# Patient Record
Sex: FEMALE | Race: WHITE | Employment: OTHER | URBAN - NONMETROPOLITAN AREA
[De-identification: names, ages, dates, MRNs, and addresses within clinical notes are randomized per-mention and may not be internally consistent; named-entity substitution may affect disease eponyms.]

---

## 2017-01-13 ENCOUNTER — HOSPITAL ENCOUNTER (OUTPATIENT)
Dept: PAIN MANAGEMENT | Age: 32
Discharge: HOME | End: 2017-01-13
Payer: MEDICAID

## 2017-01-13 VITALS
WEIGHT: 161 LBS | SYSTOLIC BLOOD PRESSURE: 123 MMHG | TEMPERATURE: 97.4 F | DIASTOLIC BLOOD PRESSURE: 85 MMHG | BODY MASS INDEX: 25.88 KG/M2 | HEIGHT: 66 IN | RESPIRATION RATE: 16 BRPM | HEART RATE: 73 BPM | OXYGEN SATURATION: 100 %

## 2017-01-13 DIAGNOSIS — G89.29 CHRONIC BILATERAL LOW BACK PAIN WITHOUT SCIATICA: ICD-10-CM

## 2017-01-13 DIAGNOSIS — M70.61 TROCHANTERIC BURSITIS OF BOTH HIPS: ICD-10-CM

## 2017-01-13 DIAGNOSIS — M70.62 TROCHANTERIC BURSITIS OF BOTH HIPS: ICD-10-CM

## 2017-01-13 DIAGNOSIS — M54.50 CHRONIC BILATERAL LOW BACK PAIN WITHOUT SCIATICA: ICD-10-CM

## 2017-01-13 DIAGNOSIS — G89.29 CHRONIC PAIN OF BOTH SHOULDERS: ICD-10-CM

## 2017-01-13 DIAGNOSIS — M25.552 PAIN OF BOTH HIP JOINTS: ICD-10-CM

## 2017-01-13 DIAGNOSIS — M25.551 PAIN OF BOTH HIP JOINTS: ICD-10-CM

## 2017-01-13 DIAGNOSIS — M25.511 CHRONIC PAIN OF BOTH SHOULDERS: ICD-10-CM

## 2017-01-13 DIAGNOSIS — M25.512 CHRONIC PAIN OF BOTH SHOULDERS: ICD-10-CM

## 2017-01-13 PROCEDURE — 6360000002 HC RX W HCPCS

## 2017-01-13 PROCEDURE — 2500000003 HC RX 250 WO HCPCS

## 2017-01-13 PROCEDURE — 20611 DRAIN/INJ JOINT/BURSA W/US: CPT

## 2017-01-13 RX ORDER — OXYCODONE HYDROCHLORIDE 15 MG/1
15 TABLET ORAL EVERY 6 HOURS PRN
Qty: 120 TABLET | Refills: 0 | Status: SHIPPED | OUTPATIENT
Start: 2017-02-25 | End: 2017-03-21 | Stop reason: SDUPTHER

## 2017-01-13 RX ORDER — OXYCODONE HYDROCHLORIDE 15 MG/1
15 TABLET ORAL EVERY 6 HOURS PRN
Qty: 120 TABLET | Refills: 0 | Status: SHIPPED | OUTPATIENT
Start: 2017-01-26 | End: 2017-01-13 | Stop reason: SDUPTHER

## 2017-01-13 RX ORDER — TRIAMCINOLONE ACETONIDE 40 MG/ML
INJECTION, SUSPENSION INTRA-ARTICULAR; INTRAMUSCULAR
Status: COMPLETED | OUTPATIENT
Start: 2017-01-13 | End: 2017-01-13

## 2017-01-13 RX ORDER — BUPIVACAINE HYDROCHLORIDE 5 MG/ML
INJECTION, SOLUTION EPIDURAL; INTRACAUDAL
Status: COMPLETED | OUTPATIENT
Start: 2017-01-13 | End: 2017-01-13

## 2017-01-13 RX ADMIN — BUPIVACAINE HYDROCHLORIDE 9 ML: 5 INJECTION, SOLUTION EPIDURAL; INTRACAUDAL at 12:37

## 2017-01-13 RX ADMIN — TRIAMCINOLONE ACETONIDE 40 MG: 40 INJECTION, SUSPENSION INTRA-ARTICULAR; INTRAMUSCULAR at 12:37

## 2017-01-13 ASSESSMENT — PAIN - FUNCTIONAL ASSESSMENT: PAIN_FUNCTIONAL_ASSESSMENT: 0-10

## 2017-01-13 ASSESSMENT — ACTIVITIES OF DAILY LIVING (ADL): EFFECT OF PAIN ON DAILY ACTIVITIES: DAILY CHORES AND ACTIVITIES

## 2017-01-13 ASSESSMENT — PAIN DESCRIPTION - DESCRIPTORS: DESCRIPTORS: ACHING;THROBBING

## 2017-03-22 RX ORDER — OXYCODONE HYDROCHLORIDE 15 MG/1
15 TABLET ORAL EVERY 6 HOURS PRN
Qty: 120 TABLET | Refills: 0 | Status: SHIPPED | OUTPATIENT
Start: 2017-03-27 | End: 2017-04-17 | Stop reason: SDUPTHER

## 2017-04-17 ENCOUNTER — HOSPITAL ENCOUNTER (OUTPATIENT)
Dept: PAIN MANAGEMENT | Age: 32
Discharge: HOME | End: 2017-04-17
Payer: MEDICARE

## 2017-04-17 VITALS
HEIGHT: 66 IN | DIASTOLIC BLOOD PRESSURE: 72 MMHG | RESPIRATION RATE: 18 BRPM | TEMPERATURE: 98.1 F | OXYGEN SATURATION: 100 % | SYSTOLIC BLOOD PRESSURE: 128 MMHG | BODY MASS INDEX: 21.69 KG/M2 | HEART RATE: 60 BPM | WEIGHT: 135 LBS

## 2017-04-17 VITALS
RESPIRATION RATE: 18 BRPM | SYSTOLIC BLOOD PRESSURE: 114 MMHG | HEART RATE: 60 BPM | DIASTOLIC BLOOD PRESSURE: 75 MMHG | OXYGEN SATURATION: 97 %

## 2017-04-17 DIAGNOSIS — M25.552 BILATERAL HIP PAIN: ICD-10-CM

## 2017-04-17 DIAGNOSIS — M70.61 TROCHANTERIC BURSITIS OF BOTH HIPS: ICD-10-CM

## 2017-04-17 DIAGNOSIS — M25.551 BILATERAL HIP PAIN: ICD-10-CM

## 2017-04-17 DIAGNOSIS — M70.62 TROCHANTERIC BURSITIS OF BOTH HIPS: ICD-10-CM

## 2017-04-17 PROCEDURE — 2500000003 HC RX 250 WO HCPCS

## 2017-04-17 PROCEDURE — 20553 NJX 1/MLT TRIGGER POINTS 3/>: CPT

## 2017-04-17 PROCEDURE — 3209999900 FLUORO FOR SURGICAL PROCEDURES

## 2017-04-17 PROCEDURE — 20610 DRAIN/INJ JOINT/BURSA W/O US: CPT

## 2017-04-17 PROCEDURE — 6360000002 HC RX W HCPCS

## 2017-04-17 RX ORDER — BUPIVACAINE HYDROCHLORIDE 5 MG/ML
INJECTION, SOLUTION EPIDURAL; INTRACAUDAL
Status: COMPLETED | OUTPATIENT
Start: 2017-04-17 | End: 2017-04-17

## 2017-04-17 RX ORDER — TRIAMCINOLONE ACETONIDE 40 MG/ML
INJECTION, SUSPENSION INTRA-ARTICULAR; INTRAMUSCULAR
Status: COMPLETED | OUTPATIENT
Start: 2017-04-17 | End: 2017-04-17

## 2017-04-17 RX ADMIN — TRIAMCINOLONE ACETONIDE 80 MG: 40 INJECTION, SUSPENSION INTRA-ARTICULAR; INTRAMUSCULAR at 11:17

## 2017-04-17 RX ADMIN — BUPIVACAINE HYDROCHLORIDE 9.5 ML: 5 INJECTION, SOLUTION EPIDURAL; INTRACAUDAL at 11:17

## 2017-04-17 RX ADMIN — BUPIVACAINE HYDROCHLORIDE 18 ML: 5 INJECTION, SOLUTION EPIDURAL; INTRACAUDAL at 11:16

## 2017-04-17 RX ADMIN — TRIAMCINOLONE ACETONIDE 20 MG: 40 INJECTION, SUSPENSION INTRA-ARTICULAR; INTRAMUSCULAR at 11:18

## 2017-04-17 ASSESSMENT — PAIN - FUNCTIONAL ASSESSMENT: PAIN_FUNCTIONAL_ASSESSMENT: 0-10

## 2017-04-17 ASSESSMENT — PAIN DESCRIPTION - DESCRIPTORS: DESCRIPTORS: ACHING;THROBBING

## 2017-04-17 ASSESSMENT — ACTIVITIES OF DAILY LIVING (ADL): EFFECT OF PAIN ON DAILY ACTIVITIES: DAILY CHORES AND ACTIVITIES

## 2017-05-04 ENCOUNTER — HOSPITAL ENCOUNTER (EMERGENCY)
Facility: HOSPITAL | Age: 32
Discharge: HOME | End: 2017-05-04
Attending: EMERGENCY MEDICINE | Admitting: EMERGENCY MEDICINE

## 2017-05-04 ENCOUNTER — APPOINTMENT (OUTPATIENT)
Dept: CT IMAGING | Facility: HOSPITAL | Age: 32
End: 2017-05-04

## 2017-05-04 ENCOUNTER — HOSPITAL ENCOUNTER (OUTPATIENT)
Dept: CT IMAGING | Facility: HOSPITAL | Age: 32
End: 2017-05-04

## 2017-05-04 VITALS
HEART RATE: 76 BPM | DIASTOLIC BLOOD PRESSURE: 76 MMHG | SYSTOLIC BLOOD PRESSURE: 148 MMHG | OXYGEN SATURATION: 100 % | TEMPERATURE: 97.5 F | RESPIRATION RATE: 16 BRPM
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
## 2017-05-04 DIAGNOSIS — S20.212A CONTUSION OF LEFT FRONT WALL OF THORAX, INITIAL ENCOUNTER: ICD-10-CM

## 2017-05-04 LAB
HOLD SPECIMEN: NORMAL
HOLD SPECIMEN: NORMAL
WHOLE BLOOD HOLD SPECIMEN: NORMAL
WHOLE BLOOD HOLD SPECIMEN: NORMAL

## 2017-05-04 PROCEDURE — 99284 EMERGENCY DEPT VISIT MOD MDM: CPT

## 2017-05-04 PROCEDURE — 72125 CT NECK SPINE W/O DYE: CPT

## 2017-05-04 PROCEDURE — 70450 CT HEAD/BRAIN W/O DYE: CPT

## 2017-05-04 RX ORDER — ALPRAZOLAM 1 MG/1
1 TABLET ORAL 2 TIMES DAILY PRN
COMMUNITY
End: 2020-03-25

## 2017-05-04 RX ORDER — ISOSORBIDE MONONITRATE 60 MG/1
80 TABLET, EXTENDED RELEASE ORAL 2 TIMES DAILY
COMMUNITY
End: 2020-03-25

## 2017-05-04 RX ORDER — ESTRADIOL 1 MG/1
1 TABLET ORAL DAILY
COMMUNITY

## 2017-05-04 RX ORDER — METHOCARBAMOL 750 MG/1
750 TABLET, FILM COATED ORAL 3 TIMES DAILY
COMMUNITY

## 2017-05-04 RX ORDER — AZATHIOPRINE 100 MG/1
150 TABLET ORAL 2 TIMES DAILY
COMMUNITY
End: 2021-02-20

## 2017-05-04 RX ORDER — OXYCODONE HYDROCHLORIDE 15 MG/1
15 TABLET ORAL EVERY 4 HOURS PRN
COMMUNITY

## 2017-05-04 RX ORDER — GABAPENTIN 100 MG/1
900 CAPSULE ORAL 3 TIMES DAILY
COMMUNITY

## 2017-05-19 ENCOUNTER — HOSPITAL ENCOUNTER (OUTPATIENT)
Dept: PAIN MANAGEMENT | Age: 32
Discharge: HOME | End: 2017-05-19
Payer: MEDICARE

## 2017-05-19 VITALS
SYSTOLIC BLOOD PRESSURE: 126 MMHG | TEMPERATURE: 98.6 F | RESPIRATION RATE: 16 BRPM | HEART RATE: 66 BPM | OXYGEN SATURATION: 99 % | DIASTOLIC BLOOD PRESSURE: 89 MMHG

## 2017-05-19 PROCEDURE — 80307 DRUG TEST PRSMV CHEM ANLYZR: CPT

## 2017-05-19 PROCEDURE — 99214 OFFICE O/P EST MOD 30 MIN: CPT

## 2017-05-19 ASSESSMENT — PAIN DESCRIPTION - PROGRESSION: CLINICAL_PROGRESSION: NOT CHANGED

## 2017-05-19 ASSESSMENT — PAIN DESCRIPTION - FREQUENCY: FREQUENCY: CONTINUOUS

## 2017-05-19 ASSESSMENT — PAIN DESCRIPTION - ONSET: ONSET: AWAKENED FROM SLEEP

## 2017-05-19 ASSESSMENT — PAIN DESCRIPTION - DESCRIPTORS: DESCRIPTORS: THROBBING

## 2017-05-19 ASSESSMENT — PAIN SCALES - GENERAL: PAINLEVEL_OUTOF10: 4

## 2017-05-19 ASSESSMENT — PAIN DESCRIPTION - LOCATION: LOCATION: HIP

## 2017-05-19 ASSESSMENT — PAIN DESCRIPTION - PAIN TYPE: TYPE: CHRONIC PAIN

## 2017-05-19 ASSESSMENT — ACTIVITIES OF DAILY LIVING (ADL): EFFECT OF PAIN ON DAILY ACTIVITIES: DAILY CHORES AND ACTIVITIES

## 2017-05-30 LAB
ALPRAZOLAM, URINE CONFIRM: 2310 NG/ML
ALPRAZOLAM, URINE: POSITIVE
AMOBARBITAL, URINE: NEGATIVE
AMPHETAMINES, URINE: NEGATIVE NG/ML
BARBITURATES, URINE: ABNORMAL NG/ML
BARBITURATES, URINE: POSITIVE
BENZODIAZEPINES, URINE: ABNORMAL NG/ML
BENZODIAZEPINES, URINE: POSITIVE NG/ML
BUTALBITAL, GC/MS URINE: 1570 NG/ML
BUTALBITAL, URINE: POSITIVE
CANNABINOIDS, URINE: NEGATIVE NG/ML
CLONAZEPAM, URINE: NEGATIVE
COCAINE METABOLITE, URINE: NEGATIVE NG/ML
CREATININE, URINE: 106.6 MG/DL (ref 20–300)
ETHANOL U, QUAN: NEGATIVE %
FENTANYL URINE: NEGATIVE PG/ML
FLURAZEPAM, UR: NEGATIVE
LORAZEPAM, URINE: NEGATIVE
MEPERIDINE, UR: NEGATIVE NG/ML
METHADONE SCREEN, URINE: NEGATIVE NG/ML
MIDAZOLAM, URINE: NEGATIVE
NORDIAZEPAM, URINE: NEGATIVE
OPIATES, URINE: ABNORMAL NG/ML
OPIATES, URINE: NEGATIVE NG/ML
OXAZEPAM, UR CONF: 5410 NG/ML
OXAZEPAM, URINE: POSITIVE
OXYCODONE URINE: POSITIVE
OXYCODONE, UR GC/MS: >3000 NG/ML
OXYCODONE/OXYMORPH, UR: POSITIVE
OXYCODONE/OXYMORPHONE, UR: ABNORMAL NG/ML
OXYMORPHONE, UR GC/MS: >3000 NG/ML
OXYMORPHONE, URINE: POSITIVE
PENTOBARBITAL, UR: NEGATIVE
PH, URINE: 5.5 (ref 4.5–8.9)
PHENCYCLIDINE, URINE: NEGATIVE NG/ML
PHENOBARBITAL, UR: NEGATIVE
PROPOXYPHENE, URINE: NEGATIVE NG/ML
SECOBARBITAL, UR: NEGATIVE
TEMAZEPAM, UR CONF: ABNORMAL NG/ML
TEMAZEPAM, URINE: POSITIVE
TRIAZOLAM, URINE: NEGATIVE

## 2017-06-21 RX ORDER — OXYCODONE HYDROCHLORIDE 15 MG/1
15 TABLET ORAL EVERY 6 HOURS PRN
Qty: 120 TABLET | Refills: 0 | Status: SHIPPED | OUTPATIENT
Start: 2017-06-25 | End: 2017-07-20 | Stop reason: SDUPTHER

## 2017-07-10 ENCOUNTER — HOSPITAL ENCOUNTER (OUTPATIENT)
Dept: MRI IMAGING | Facility: HOSPITAL | Age: 32
Discharge: HOME | End: 2017-07-10

## 2017-07-10 ENCOUNTER — HOSPITAL ENCOUNTER (OUTPATIENT)
Dept: MRI IMAGING | Facility: HOSPITAL | Age: 32
Discharge: HOME | End: 2017-07-10
Admitting: PSYCHIATRY & NEUROLOGY

## 2017-07-10 DIAGNOSIS — G36.0 NEUROMYELITIS OPTICA (DEVIC) (HCC): ICD-10-CM

## 2017-07-10 PROCEDURE — 72157 MRI CHEST SPINE W/O & W/DYE: CPT

## 2017-07-10 PROCEDURE — 72156 MRI NECK SPINE W/O & W/DYE: CPT

## 2017-07-10 PROCEDURE — 25010000002 GADOTERIDOL PER 1 ML: Performed by: PSYCHIATRY & NEUROLOGY

## 2017-07-10 PROCEDURE — A9576 INJ PROHANCE MULTIPACK: HCPCS | Performed by: PSYCHIATRY & NEUROLOGY

## 2017-07-10 PROCEDURE — 70553 MRI BRAIN STEM W/O & W/DYE: CPT

## 2017-07-10 RX ADMIN — GADOTERIDOL 12 ML: 279.3 INJECTION, SOLUTION INTRAVENOUS at 15:00

## 2017-07-20 ENCOUNTER — HOSPITAL ENCOUNTER (OUTPATIENT)
Dept: PAIN MANAGEMENT | Age: 32
Discharge: HOME | End: 2017-07-20
Payer: MEDICARE

## 2017-07-20 VITALS
RESPIRATION RATE: 18 BRPM | WEIGHT: 131 LBS | BODY MASS INDEX: 21.05 KG/M2 | DIASTOLIC BLOOD PRESSURE: 62 MMHG | TEMPERATURE: 98.1 F | HEIGHT: 66 IN | HEART RATE: 70 BPM | OXYGEN SATURATION: 97 % | SYSTOLIC BLOOD PRESSURE: 113 MMHG

## 2017-07-20 DIAGNOSIS — M25.552 BILATERAL HIP PAIN: ICD-10-CM

## 2017-07-20 DIAGNOSIS — M54.50 CHRONIC BILATERAL LOW BACK PAIN WITHOUT SCIATICA: ICD-10-CM

## 2017-07-20 DIAGNOSIS — M25.551 PAIN OF BOTH HIP JOINTS: ICD-10-CM

## 2017-07-20 DIAGNOSIS — M70.61 TROCHANTERIC BURSITIS OF BOTH HIPS: ICD-10-CM

## 2017-07-20 DIAGNOSIS — M25.551 BILATERAL HIP PAIN: ICD-10-CM

## 2017-07-20 DIAGNOSIS — M25.512 CHRONIC PAIN OF BOTH SHOULDERS: ICD-10-CM

## 2017-07-20 DIAGNOSIS — M70.62 TROCHANTERIC BURSITIS OF BOTH HIPS: ICD-10-CM

## 2017-07-20 DIAGNOSIS — G89.29 CHRONIC BILATERAL LOW BACK PAIN WITHOUT SCIATICA: ICD-10-CM

## 2017-07-20 DIAGNOSIS — G89.29 CHRONIC PAIN OF BOTH SHOULDERS: ICD-10-CM

## 2017-07-20 DIAGNOSIS — M25.552 PAIN OF BOTH HIP JOINTS: ICD-10-CM

## 2017-07-20 DIAGNOSIS — M25.511 CHRONIC PAIN OF BOTH SHOULDERS: ICD-10-CM

## 2017-07-20 PROCEDURE — 20553 NJX 1/MLT TRIGGER POINTS 3/>: CPT

## 2017-07-20 PROCEDURE — 2500000003 HC RX 250 WO HCPCS

## 2017-07-20 PROCEDURE — 20610 DRAIN/INJ JOINT/BURSA W/O US: CPT

## 2017-07-20 PROCEDURE — 6360000002 HC RX W HCPCS

## 2017-07-20 PROCEDURE — 3209999900 FLUORO FOR SURGICAL PROCEDURES

## 2017-07-20 RX ORDER — BUPIVACAINE HYDROCHLORIDE 5 MG/ML
INJECTION, SOLUTION EPIDURAL; INTRACAUDAL
Status: COMPLETED | OUTPATIENT
Start: 2017-07-20 | End: 2017-07-20

## 2017-07-20 RX ORDER — OXYCODONE HYDROCHLORIDE 15 MG/1
15 TABLET ORAL EVERY 6 HOURS PRN
Qty: 120 TABLET | Refills: 0 | Status: SHIPPED | OUTPATIENT
Start: 2017-07-25 | End: 2017-08-21 | Stop reason: SDUPTHER

## 2017-07-20 RX ORDER — TRIAMCINOLONE ACETONIDE 40 MG/ML
INJECTION, SUSPENSION INTRA-ARTICULAR; INTRAMUSCULAR
Status: COMPLETED | OUTPATIENT
Start: 2017-07-20 | End: 2017-07-20

## 2017-07-20 RX ADMIN — BUPIVACAINE HYDROCHLORIDE 9.5 ML: 5 INJECTION, SOLUTION EPIDURAL; INTRACAUDAL at 14:52

## 2017-07-20 RX ADMIN — TRIAMCINOLONE ACETONIDE 40 MG: 40 INJECTION, SUSPENSION INTRA-ARTICULAR; INTRAMUSCULAR at 14:52

## 2017-07-20 RX ADMIN — TRIAMCINOLONE ACETONIDE 20 MG: 40 INJECTION, SUSPENSION INTRA-ARTICULAR; INTRAMUSCULAR at 14:54

## 2017-07-20 RX ADMIN — BUPIVACAINE HYDROCHLORIDE 9 ML: 5 INJECTION, SOLUTION EPIDURAL; INTRACAUDAL at 14:51

## 2017-07-20 ASSESSMENT — PAIN DESCRIPTION - DESCRIPTORS: DESCRIPTORS: ACHING;CONSTANT;THROBBING;RADIATING

## 2017-07-20 ASSESSMENT — PAIN - FUNCTIONAL ASSESSMENT: PAIN_FUNCTIONAL_ASSESSMENT: 0-10

## 2017-07-20 ASSESSMENT — ACTIVITIES OF DAILY LIVING (ADL): EFFECT OF PAIN ON DAILY ACTIVITIES: DAILY CHORES AND ACTIVITIES

## 2017-08-21 RX ORDER — OXYCODONE HYDROCHLORIDE 15 MG/1
15 TABLET ORAL EVERY 6 HOURS PRN
Qty: 120 TABLET | Refills: 0 | Status: SHIPPED | OUTPATIENT
Start: 2017-08-24 | End: 2017-09-18 | Stop reason: SDUPTHER

## 2017-09-18 ENCOUNTER — HOSPITAL ENCOUNTER (OUTPATIENT)
Dept: PAIN MANAGEMENT | Age: 32
Discharge: HOME | End: 2017-09-18
Payer: MEDICARE

## 2017-09-18 VITALS
TEMPERATURE: 97.1 F | HEART RATE: 65 BPM | SYSTOLIC BLOOD PRESSURE: 120 MMHG | DIASTOLIC BLOOD PRESSURE: 87 MMHG | BODY MASS INDEX: 19.13 KG/M2 | RESPIRATION RATE: 18 BRPM | WEIGHT: 119 LBS | HEIGHT: 66 IN | OXYGEN SATURATION: 98 %

## 2017-09-18 DIAGNOSIS — M25.511 CHRONIC PAIN OF BOTH SHOULDERS: ICD-10-CM

## 2017-09-18 DIAGNOSIS — G89.29 CHRONIC PAIN OF BOTH SHOULDERS: ICD-10-CM

## 2017-09-18 DIAGNOSIS — M25.551 PAIN OF BOTH HIP JOINTS: ICD-10-CM

## 2017-09-18 DIAGNOSIS — M70.62 TROCHANTERIC BURSITIS OF BOTH HIPS: ICD-10-CM

## 2017-09-18 DIAGNOSIS — M25.512 CHRONIC PAIN OF BOTH SHOULDERS: ICD-10-CM

## 2017-09-18 DIAGNOSIS — G89.29 CHRONIC BILATERAL LOW BACK PAIN WITHOUT SCIATICA: ICD-10-CM

## 2017-09-18 DIAGNOSIS — M25.552 PAIN OF BOTH HIP JOINTS: ICD-10-CM

## 2017-09-18 DIAGNOSIS — M54.50 CHRONIC BILATERAL LOW BACK PAIN WITHOUT SCIATICA: ICD-10-CM

## 2017-09-18 DIAGNOSIS — M70.61 TROCHANTERIC BURSITIS OF BOTH HIPS: ICD-10-CM

## 2017-09-18 PROCEDURE — 99213 OFFICE O/P EST LOW 20 MIN: CPT

## 2017-09-18 RX ORDER — OXYCODONE HYDROCHLORIDE 15 MG/1
15 TABLET ORAL EVERY 6 HOURS PRN
Qty: 120 TABLET | Refills: 0 | Status: SHIPPED | OUTPATIENT
Start: 2017-09-23 | End: 2017-10-23 | Stop reason: SDUPTHER

## 2017-09-18 ASSESSMENT — ACTIVITIES OF DAILY LIVING (ADL): EFFECT OF PAIN ON DAILY ACTIVITIES: LIMITS ACTIVITY

## 2017-09-18 ASSESSMENT — PAIN DESCRIPTION - LOCATION: LOCATION: HIP;NECK

## 2017-09-18 ASSESSMENT — PAIN DESCRIPTION - PROGRESSION: CLINICAL_PROGRESSION: NOT CHANGED

## 2017-09-18 ASSESSMENT — PAIN DESCRIPTION - DESCRIPTORS: DESCRIPTORS: ACHING;CONSTANT;THROBBING;RADIATING

## 2017-09-18 ASSESSMENT — PAIN DESCRIPTION - FREQUENCY: FREQUENCY: CONTINUOUS

## 2017-09-18 ASSESSMENT — PAIN DESCRIPTION - DIRECTION: RADIATING_TOWARDS: INTO BILATERAL LEGS

## 2017-09-18 ASSESSMENT — PAIN DESCRIPTION - ONSET: ONSET: ON-GOING

## 2017-09-18 ASSESSMENT — PAIN SCALES - GENERAL: PAINLEVEL_OUTOF10: 6

## 2017-09-18 ASSESSMENT — PAIN DESCRIPTION - PAIN TYPE: TYPE: CHRONIC PAIN

## 2017-09-18 ASSESSMENT — PAIN DESCRIPTION - ORIENTATION: ORIENTATION: RIGHT;LEFT;MID

## 2017-10-23 ENCOUNTER — HOSPITAL ENCOUNTER (OUTPATIENT)
Dept: PAIN MANAGEMENT | Age: 32
Discharge: HOME | End: 2017-10-23
Payer: OTHER MISCELLANEOUS

## 2017-10-23 VITALS
RESPIRATION RATE: 16 BRPM | TEMPERATURE: 98.4 F | BODY MASS INDEX: 18.96 KG/M2 | WEIGHT: 118 LBS | SYSTOLIC BLOOD PRESSURE: 127 MMHG | DIASTOLIC BLOOD PRESSURE: 76 MMHG | HEART RATE: 58 BPM | OXYGEN SATURATION: 99 % | HEIGHT: 66 IN

## 2017-10-23 DIAGNOSIS — M25.512 CHRONIC PAIN OF BOTH SHOULDERS: ICD-10-CM

## 2017-10-23 DIAGNOSIS — G89.29 CHRONIC PAIN OF BOTH SHOULDERS: ICD-10-CM

## 2017-10-23 DIAGNOSIS — M54.50 CHRONIC BILATERAL LOW BACK PAIN WITHOUT SCIATICA: ICD-10-CM

## 2017-10-23 DIAGNOSIS — M25.551 PAIN OF BOTH HIP JOINTS: ICD-10-CM

## 2017-10-23 DIAGNOSIS — G89.29 CHRONIC BILATERAL LOW BACK PAIN WITHOUT SCIATICA: ICD-10-CM

## 2017-10-23 DIAGNOSIS — M70.61 TROCHANTERIC BURSITIS OF BOTH HIPS: ICD-10-CM

## 2017-10-23 DIAGNOSIS — M25.552 PAIN OF BOTH HIP JOINTS: ICD-10-CM

## 2017-10-23 DIAGNOSIS — M25.511 CHRONIC PAIN OF BOTH SHOULDERS: ICD-10-CM

## 2017-10-23 DIAGNOSIS — M25.552 BILATERAL HIP PAIN: ICD-10-CM

## 2017-10-23 DIAGNOSIS — M70.62 TROCHANTERIC BURSITIS OF BOTH HIPS: ICD-10-CM

## 2017-10-23 DIAGNOSIS — M25.551 BILATERAL HIP PAIN: ICD-10-CM

## 2017-10-23 PROCEDURE — 20610 DRAIN/INJ JOINT/BURSA W/O US: CPT

## 2017-10-23 PROCEDURE — 3209999900 FLUORO FOR SURGICAL PROCEDURES

## 2017-10-23 PROCEDURE — 6360000002 HC RX W HCPCS

## 2017-10-23 PROCEDURE — 20553 NJX 1/MLT TRIGGER POINTS 3/>: CPT

## 2017-10-23 PROCEDURE — 2500000003 HC RX 250 WO HCPCS

## 2017-10-23 RX ORDER — TRIAMCINOLONE ACETONIDE 40 MG/ML
INJECTION, SUSPENSION INTRA-ARTICULAR; INTRAMUSCULAR
Status: COMPLETED | OUTPATIENT
Start: 2017-10-23 | End: 2017-10-23

## 2017-10-23 RX ORDER — OXYCODONE HYDROCHLORIDE 15 MG/1
15 TABLET ORAL EVERY 6 HOURS PRN
Qty: 120 TABLET | Refills: 0 | Status: SHIPPED | OUTPATIENT
Start: 2017-10-23 | End: 2017-11-16 | Stop reason: SDUPTHER

## 2017-10-23 RX ORDER — BUPIVACAINE HYDROCHLORIDE 5 MG/ML
INJECTION, SOLUTION EPIDURAL; INTRACAUDAL
Status: COMPLETED | OUTPATIENT
Start: 2017-10-23 | End: 2017-10-23

## 2017-10-23 RX ADMIN — BUPIVACAINE HYDROCHLORIDE 9 ML: 5 INJECTION, SOLUTION EPIDURAL; INTRACAUDAL at 13:44

## 2017-10-23 RX ADMIN — TRIAMCINOLONE ACETONIDE 20 MG: 40 INJECTION, SUSPENSION INTRA-ARTICULAR; INTRAMUSCULAR at 13:47

## 2017-10-23 RX ADMIN — BUPIVACAINE HYDROCHLORIDE 9.5 ML: 5 INJECTION, SOLUTION EPIDURAL; INTRACAUDAL at 13:46

## 2017-10-23 RX ADMIN — TRIAMCINOLONE ACETONIDE 40 MG: 40 INJECTION, SUSPENSION INTRA-ARTICULAR; INTRAMUSCULAR at 13:44

## 2017-10-23 ASSESSMENT — PAIN DESCRIPTION - LOCATION: LOCATION: BACK;HIP

## 2017-10-23 ASSESSMENT — PAIN DESCRIPTION - PROGRESSION: CLINICAL_PROGRESSION: NOT CHANGED

## 2017-10-23 ASSESSMENT — PAIN DESCRIPTION - PAIN TYPE: TYPE: CHRONIC PAIN

## 2017-10-23 ASSESSMENT — PAIN DESCRIPTION - DESCRIPTORS: DESCRIPTORS: ACHING;CONSTANT;RADIATING;THROBBING

## 2017-10-23 ASSESSMENT — PAIN - FUNCTIONAL ASSESSMENT: PAIN_FUNCTIONAL_ASSESSMENT: 0-10

## 2017-10-23 ASSESSMENT — PAIN DESCRIPTION - ORIENTATION: ORIENTATION: RIGHT;LEFT;MID;LOWER

## 2017-10-23 ASSESSMENT — PAIN SCALES - GENERAL: PAINLEVEL_OUTOF10: 6

## 2017-10-23 ASSESSMENT — PAIN DESCRIPTION - ONSET: ONSET: ON-GOING

## 2017-10-23 ASSESSMENT — PAIN DESCRIPTION - FREQUENCY: FREQUENCY: CONTINUOUS

## 2017-10-23 NOTE — PROGRESS NOTES
Nursing Admission Record    Current Issues / Falls / ER Visits:  No    Percentage of Pain Relief after Last Procedure:  60 %    How long lasted:  2 months    Radiology exams received during the last 12 months: Yes       When 7/10/2017                             [facility]                       Imaging records requested: No  MRI exams received in the past 2 years:  Yes  Physical therapy during the last 6 months: No  Labs during the last 12 months: Yes    Education Provided:  [x] Review of Gabriela Peters  [] Agreement Review  [] Compliance Issues Discussed    [] Cognitive Behavior Needs [] Exercise [] Review of Test [] Financial Issues  [] Tobacco/Alcohol Use [x] Teaching [] New Patient [] Picture Obtained    Physician Plan:  [] Outgoing Referral  [] Pharmacy Consult  [] Test Ordered   [] Obtained Test Results / Consult Notes  [] UDS due at next visit, verified per EPIC      [] Suspected Physical Abuse or Suicide Risk assessed - IF YES COMPLETE QUESTIONS BELOW    If any of the following questions are answered yes - contact attending physician for referral:    Has been considering harming self to escape stress, pain problems? [] YES  [] NO  Has a suicide plan? [] YES  [] NO  Has attempted suicide in the past?   [] YES  [] NO  Has a close friend or family member who committed suicide? [] YES  [] NO    Patient Referred To :      Additional Notes:    Assessment Completed by:  Electronically signed by Robyn Soriano RN on 10/23/2017 at 12:39 PM

## 2017-10-23 NOTE — PROCEDURES
any questions or concerns which may arise prior to the next office visit. [x] Bilateral Trochanteric Bursa  []   []   [x] Cervical Trigger Point                [] Over 50%of today's appointment was given to discussion, evaluation and counseling. PLAN:  [] Will return to office in 3 month(s)  for [x] Planned Procedure [] Office Visit  [] Prescriptions were given today    [] No prescriptions needed today  [] Patient is to call with any questions or concerns which may arise prior to the next office visit.

## 2017-11-20 RX ORDER — OXYCODONE HYDROCHLORIDE 15 MG/1
15 TABLET ORAL EVERY 6 HOURS PRN
Qty: 120 TABLET | Refills: 0 | Status: SHIPPED | OUTPATIENT
Start: 2017-11-22 | End: 2017-12-15 | Stop reason: SDUPTHER

## 2017-12-18 RX ORDER — OXYCODONE HYDROCHLORIDE 15 MG/1
15 TABLET ORAL EVERY 6 HOURS PRN
Qty: 120 TABLET | Refills: 0 | Status: SHIPPED | OUTPATIENT
Start: 2017-12-22 | End: 2018-01-16 | Stop reason: SDUPTHER

## 2018-01-17 RX ORDER — OXYCODONE HYDROCHLORIDE 15 MG/1
15 TABLET ORAL EVERY 6 HOURS PRN
Qty: 120 TABLET | Refills: 0 | Status: SHIPPED | OUTPATIENT
Start: 2018-01-21 | End: 2018-02-14 | Stop reason: SDUPTHER

## 2018-02-15 RX ORDER — OXYCODONE HYDROCHLORIDE 15 MG/1
15 TABLET ORAL EVERY 6 HOURS PRN
Qty: 120 TABLET | Refills: 0 | Status: SHIPPED | OUTPATIENT
Start: 2018-02-20 | End: 2018-03-13 | Stop reason: SDUPTHER

## 2018-03-14 RX ORDER — OXYCODONE HYDROCHLORIDE 15 MG/1
15 TABLET ORAL EVERY 6 HOURS PRN
Qty: 16 TABLET | Refills: 0 | Status: SHIPPED | OUTPATIENT
Start: 2018-03-22 | End: 2018-03-26 | Stop reason: SDUPTHER

## 2018-03-26 ENCOUNTER — HOSPITAL ENCOUNTER (OUTPATIENT)
Dept: PAIN MANAGEMENT | Age: 33
Discharge: HOME | End: 2018-03-26
Payer: MEDICARE

## 2018-03-26 VITALS
DIASTOLIC BLOOD PRESSURE: 84 MMHG | RESPIRATION RATE: 18 BRPM | OXYGEN SATURATION: 100 % | SYSTOLIC BLOOD PRESSURE: 119 MMHG | BODY MASS INDEX: 18.8 KG/M2 | HEIGHT: 66 IN | WEIGHT: 117 LBS | HEART RATE: 69 BPM | TEMPERATURE: 97.4 F

## 2018-03-26 DIAGNOSIS — M70.61 TROCHANTERIC BURSITIS OF BOTH HIPS: ICD-10-CM

## 2018-03-26 DIAGNOSIS — M70.62 TROCHANTERIC BURSITIS OF BOTH HIPS: ICD-10-CM

## 2018-03-26 PROCEDURE — 99213 OFFICE O/P EST LOW 20 MIN: CPT

## 2018-03-26 RX ORDER — OXYCODONE HYDROCHLORIDE 15 MG/1
15 TABLET ORAL EVERY 6 HOURS PRN
Qty: 120 TABLET | Refills: 0 | Status: SHIPPED | OUTPATIENT
Start: 2018-03-26 | End: 2018-04-24 | Stop reason: SDUPTHER

## 2018-03-26 ASSESSMENT — PAIN DESCRIPTION - ORIENTATION: ORIENTATION: LEFT;LOWER;RIGHT

## 2018-03-26 ASSESSMENT — PAIN DESCRIPTION - LOCATION: LOCATION: BACK;HIP;NECK

## 2018-03-26 ASSESSMENT — PAIN SCALES - GENERAL: PAINLEVEL_OUTOF10: 5

## 2018-03-26 ASSESSMENT — PAIN DESCRIPTION - FREQUENCY: FREQUENCY: CONTINUOUS

## 2018-03-26 ASSESSMENT — PAIN DESCRIPTION - PAIN TYPE: TYPE: CHRONIC PAIN

## 2018-03-26 ASSESSMENT — PAIN DESCRIPTION - ONSET: ONSET: ON-GOING

## 2018-03-26 ASSESSMENT — ACTIVITIES OF DAILY LIVING (ADL): EFFECT OF PAIN ON DAILY ACTIVITIES: LIMITS ACTIVITIES

## 2018-03-26 ASSESSMENT — PAIN DESCRIPTION - DESCRIPTORS: DESCRIPTORS: ACHING;CONSTANT;RADIATING;THROBBING

## 2018-03-26 ASSESSMENT — PAIN DESCRIPTION - PROGRESSION: CLINICAL_PROGRESSION: NOT CHANGED

## 2018-03-26 NOTE — PROGRESS NOTES
Charissa Pinto/Bjorn  Patient Pain Assessment  Progress Note      Chief Complaint   Patient presents with    Lower Back Pain    Hip Pain     bilateral    Neck Pain     radiates down to shoulders     Pain Assessment  Pain Assessment: 0-10  Pain Level: 5  Pain Type: Chronic pain  Pain Location: Back, Hip, Neck  Pain Orientation: Left, Lower, Right  Pain Radiating Towards: neck pain that radiates down to shoulders; bilateral hips; low back  Pain Descriptors: Aching, Constant, Radiating, Throbbing  Pain Frequency: Continuous  Pain Onset: On-going  Clinical Progression: Not changed  Effect of Pain on Daily Activities: limits activities       []  Issues of Concern:     [x]  Reports current medication is helping, but continues to have on-going pain    [x]  Reports current pain medication increases ability to do activities of daily living     []  No current narcotic pain medications      [x]  Discussed possible medication side effects, risk of tolerance and/or dependence, alternative treatments    [x]  Encouraged to set goals of decreasing daily narcotic intake    [x]  Discussed effects of long term narcotic use    [x]  Reviewed pain management contract     [x]  Injection options discussed and repeats scheduled for next visit     []Yes [x]No  Current medication side effects     []Yes [x]No   Acute bladder or bowel changes    Previous Procedure / Percentage of pain control / Imaging / PT History:  Percentage of Pain Relief after Last Procedure:  60 %    How long lasted:  2 months     Radiology exams received during the last 12 months: MRI Brain, Cervical and Thoracic spine  When July 2017                                              Where [address] on chart: Yes    MRI exams received in the past 2 years: MRI Brain, Cervical and Thoracic spine in July 2017 at [facility]  Physical therapy during the last 6 months: No     BMI: Body mass index is 18.88 kg/m².     [x]  Nutrient rich, low fat, low possible medication side effects, risk of tolerance and/or dependence, and alternative treatments. Discussed the effects of long term narcotic use. Patient encouraged to set daily goals of exercising and decreasing daily narcotic intake.        Electronically signed by RAJESH Foster

## 2018-04-24 ENCOUNTER — HOSPITAL ENCOUNTER (OUTPATIENT)
Dept: PAIN MANAGEMENT | Age: 33
Discharge: HOME | End: 2018-04-24
Payer: MEDICARE

## 2018-04-24 VITALS
BODY MASS INDEX: 18.8 KG/M2 | TEMPERATURE: 98.4 F | HEART RATE: 64 BPM | HEIGHT: 66 IN | DIASTOLIC BLOOD PRESSURE: 88 MMHG | WEIGHT: 117 LBS | RESPIRATION RATE: 18 BRPM | SYSTOLIC BLOOD PRESSURE: 134 MMHG | OXYGEN SATURATION: 99 %

## 2018-04-24 DIAGNOSIS — M13.851 ALLERGIC ARTHRITIS OF BOTH HIPS: Chronic | ICD-10-CM

## 2018-04-24 DIAGNOSIS — M13.852 ALLERGIC ARTHRITIS OF BOTH HIPS: Chronic | ICD-10-CM

## 2018-04-24 DIAGNOSIS — M54.50 CHRONIC BILATERAL LOW BACK PAIN WITHOUT SCIATICA: ICD-10-CM

## 2018-04-24 DIAGNOSIS — M25.512 CHRONIC PAIN OF BOTH SHOULDERS: ICD-10-CM

## 2018-04-24 DIAGNOSIS — G89.29 CHRONIC BILATERAL LOW BACK PAIN WITHOUT SCIATICA: ICD-10-CM

## 2018-04-24 DIAGNOSIS — M70.62 TROCHANTERIC BURSITIS OF BOTH HIPS: ICD-10-CM

## 2018-04-24 DIAGNOSIS — M70.61 TROCHANTERIC BURSITIS OF BOTH HIPS: ICD-10-CM

## 2018-04-24 DIAGNOSIS — M70.62 TROCHANTERIC BURSITIS OF BOTH HIPS: Primary | ICD-10-CM

## 2018-04-24 DIAGNOSIS — M25.551 PAIN OF BOTH HIP JOINTS: ICD-10-CM

## 2018-04-24 DIAGNOSIS — M25.552 PAIN OF BOTH HIP JOINTS: ICD-10-CM

## 2018-04-24 DIAGNOSIS — M25.511 CHRONIC PAIN OF BOTH SHOULDERS: ICD-10-CM

## 2018-04-24 DIAGNOSIS — G89.29 CHRONIC PAIN OF BOTH SHOULDERS: ICD-10-CM

## 2018-04-24 DIAGNOSIS — M70.61 TROCHANTERIC BURSITIS OF BOTH HIPS: Primary | ICD-10-CM

## 2018-04-24 PROCEDURE — 80307 DRUG TEST PRSMV CHEM ANLYZR: CPT

## 2018-04-24 PROCEDURE — 20610 DRAIN/INJ JOINT/BURSA W/O US: CPT

## 2018-04-24 PROCEDURE — 2500000003 HC RX 250 WO HCPCS

## 2018-04-24 PROCEDURE — 20553 NJX 1/MLT TRIGGER POINTS 3/>: CPT

## 2018-04-24 PROCEDURE — 6360000002 HC RX W HCPCS

## 2018-04-24 PROCEDURE — 3209999900 FLUORO FOR SURGICAL PROCEDURES

## 2018-04-24 RX ORDER — TRIAMCINOLONE ACETONIDE 40 MG/ML
INJECTION, SUSPENSION INTRA-ARTICULAR; INTRAMUSCULAR
Status: COMPLETED | OUTPATIENT
Start: 2018-04-24 | End: 2018-04-24

## 2018-04-24 RX ORDER — BUPIVACAINE HYDROCHLORIDE 5 MG/ML
INJECTION, SOLUTION EPIDURAL; INTRACAUDAL
Status: COMPLETED | OUTPATIENT
Start: 2018-04-24 | End: 2018-04-24

## 2018-04-24 RX ORDER — BUSPIRONE HYDROCHLORIDE 10 MG/1
TABLET ORAL
COMMUNITY
Start: 2018-04-16

## 2018-04-24 RX ORDER — OXYCODONE HYDROCHLORIDE 15 MG/1
15 TABLET ORAL EVERY 6 HOURS PRN
Qty: 120 TABLET | Refills: 0 | Status: SHIPPED | OUTPATIENT
Start: 2018-04-25 | End: 2018-05-16 | Stop reason: SDUPTHER

## 2018-04-24 RX ADMIN — TRIAMCINOLONE ACETONIDE 20 MG: 40 INJECTION, SUSPENSION INTRA-ARTICULAR; INTRAMUSCULAR at 16:29

## 2018-04-24 RX ADMIN — BUPIVACAINE HYDROCHLORIDE 9.5 ML: 5 INJECTION, SOLUTION EPIDURAL; INTRACAUDAL at 16:28

## 2018-04-24 RX ADMIN — BUPIVACAINE HYDROCHLORIDE 18 ML: 5 INJECTION, SOLUTION EPIDURAL; INTRACAUDAL at 16:27

## 2018-04-24 RX ADMIN — TRIAMCINOLONE ACETONIDE 80 MG: 40 INJECTION, SUSPENSION INTRA-ARTICULAR; INTRAMUSCULAR at 16:28

## 2018-04-24 ASSESSMENT — PAIN DESCRIPTION - DESCRIPTORS: DESCRIPTORS: ACHING;CONSTANT

## 2018-04-24 ASSESSMENT — PAIN - FUNCTIONAL ASSESSMENT: PAIN_FUNCTIONAL_ASSESSMENT: 0-10

## 2018-04-24 ASSESSMENT — ACTIVITIES OF DAILY LIVING (ADL): EFFECT OF PAIN ON DAILY ACTIVITIES: LIMITS ACTIVITY

## 2018-05-08 LAB
ALPRAZOLAM, URINE: NEGATIVE
AMOBARBITAL, URINE: NEGATIVE
AMPHETAMINES, URINE: NEGATIVE NG/ML
BARBITURATES, URINE: ABNORMAL NG/ML
BARBITURATES, URINE: POSITIVE
BENZODIAZEPINES, URINE: ABNORMAL NG/ML
BENZODIAZEPINES, URINE: POSITIVE NG/ML
BUTALBITAL, GC/MS URINE: 3330 NG/ML
BUTALBITAL, URINE: POSITIVE
CANNABINOIDS, URINE: NEGATIVE NG/ML
CLONAZEPAM, URINE: NEGATIVE
COCAINE METABOLITE, URINE: NEGATIVE NG/ML
CREATININE, URINE: 89.2 MG/DL (ref 20–300)
ETHANOL U, QUAN: NEGATIVE %
FENTANYL URINE: NEGATIVE PG/ML
FLURAZEPAM, UR: NEGATIVE
LORAZEPAM, URINE: NEGATIVE
MEPERIDINE, UR: NEGATIVE NG/ML
METHADONE SCREEN, URINE: NEGATIVE NG/ML
MIDAZOLAM, URINE: NEGATIVE
NORDIAZEPAM, URINE: NEGATIVE
OPIATES, URINE: ABNORMAL NG/ML
OPIATES, URINE: NEGATIVE NG/ML
OXAZEPAM, UR CONF: 3696 NG/ML
OXAZEPAM, URINE: POSITIVE
OXYCODONE URINE: POSITIVE
OXYCODONE, UR GC/MS: 4665 NG/ML
OXYCODONE/OXYMORPH, UR: POSITIVE
OXYCODONE/OXYMORPHONE, UR: ABNORMAL NG/ML
OXYMORPHONE, UR GC/MS: 9250 NG/ML
OXYMORPHONE, URINE: POSITIVE
PENTOBARBITAL, UR: NEGATIVE
PH, URINE: 5.4 (ref 4.5–8.9)
PHENCYCLIDINE, URINE: NEGATIVE NG/ML
PHENOBARBITAL, UR: NEGATIVE
PROPOXYPHENE, URINE: NEGATIVE NG/ML
SECOBARBITAL, UR: NEGATIVE
TEMAZEPAM, UR CONF: ABNORMAL NG/ML
TEMAZEPAM, URINE: POSITIVE
TRIAZOLAM, URINE: NEGATIVE

## 2018-05-17 RX ORDER — OXYCODONE HYDROCHLORIDE 15 MG/1
15 TABLET ORAL EVERY 6 HOURS PRN
Qty: 120 TABLET | Refills: 0 | Status: SHIPPED | OUTPATIENT
Start: 2018-05-25 | End: 2018-06-20 | Stop reason: SDUPTHER

## 2018-06-20 DIAGNOSIS — G89.29 CHRONIC BILATERAL LOW BACK PAIN WITH SCIATICA, SCIATICA LATERALITY UNSPECIFIED: Primary | ICD-10-CM

## 2018-06-20 DIAGNOSIS — M54.40 CHRONIC BILATERAL LOW BACK PAIN WITH SCIATICA, SCIATICA LATERALITY UNSPECIFIED: Primary | ICD-10-CM

## 2018-06-20 RX ORDER — OXYCODONE HYDROCHLORIDE 15 MG/1
15 TABLET ORAL EVERY 6 HOURS PRN
Qty: 120 TABLET | Refills: 0 | Status: SHIPPED | OUTPATIENT
Start: 2018-06-23 | End: 2018-07-13 | Stop reason: SDUPTHER

## 2018-06-22 ENCOUNTER — HOSPITAL ENCOUNTER (OUTPATIENT)
Dept: PAIN MANAGEMENT | Age: 33
Discharge: HOME | End: 2018-06-22
Payer: MEDICARE

## 2018-06-22 VITALS
RESPIRATION RATE: 18 BRPM | SYSTOLIC BLOOD PRESSURE: 115 MMHG | HEIGHT: 66 IN | OXYGEN SATURATION: 94 % | WEIGHT: 117 LBS | BODY MASS INDEX: 18.8 KG/M2 | TEMPERATURE: 97.3 F | DIASTOLIC BLOOD PRESSURE: 79 MMHG | HEART RATE: 78 BPM

## 2018-06-22 DIAGNOSIS — M13.852 ALLERGIC ARTHRITIS OF BOTH HIPS: ICD-10-CM

## 2018-06-22 DIAGNOSIS — M13.851 ALLERGIC ARTHRITIS OF BOTH HIPS: ICD-10-CM

## 2018-06-22 DIAGNOSIS — G89.29 CHRONIC BILATERAL LOW BACK PAIN WITHOUT SCIATICA: ICD-10-CM

## 2018-06-22 DIAGNOSIS — G89.29 CHRONIC PAIN OF BOTH SHOULDERS: ICD-10-CM

## 2018-06-22 DIAGNOSIS — M54.50 CHRONIC BILATERAL LOW BACK PAIN WITHOUT SCIATICA: ICD-10-CM

## 2018-06-22 DIAGNOSIS — M70.61 TROCHANTERIC BURSITIS OF BOTH HIPS: ICD-10-CM

## 2018-06-22 DIAGNOSIS — M25.511 CHRONIC PAIN OF BOTH SHOULDERS: ICD-10-CM

## 2018-06-22 DIAGNOSIS — M54.2 CERVICALGIA: ICD-10-CM

## 2018-06-22 DIAGNOSIS — M25.512 CHRONIC PAIN OF BOTH SHOULDERS: ICD-10-CM

## 2018-06-22 DIAGNOSIS — M25.552 PAIN OF BOTH HIP JOINTS: ICD-10-CM

## 2018-06-22 DIAGNOSIS — M70.62 TROCHANTERIC BURSITIS OF BOTH HIPS: ICD-10-CM

## 2018-06-22 DIAGNOSIS — M25.551 PAIN OF BOTH HIP JOINTS: ICD-10-CM

## 2018-06-22 PROCEDURE — 99213 OFFICE O/P EST LOW 20 MIN: CPT

## 2018-06-22 PROCEDURE — 99213 OFFICE O/P EST LOW 20 MIN: CPT | Performed by: NURSE PRACTITIONER

## 2018-06-22 ASSESSMENT — PAIN DESCRIPTION - ORIENTATION: ORIENTATION: LOWER;MID;UPPER

## 2018-06-22 ASSESSMENT — PAIN DESCRIPTION - PAIN TYPE: TYPE: CHRONIC PAIN

## 2018-06-22 ASSESSMENT — PAIN SCALES - GENERAL: PAINLEVEL_OUTOF10: 5

## 2018-06-22 ASSESSMENT — ENCOUNTER SYMPTOMS
DOUBLE VISION: 0
CONSTIPATION: 0
SORE THROAT: 0
BLURRED VISION: 0
WHEEZING: 0
SHORTNESS OF BREATH: 0
BACK PAIN: 1
ABDOMINAL PAIN: 0

## 2018-06-22 ASSESSMENT — PAIN DESCRIPTION - PROGRESSION: CLINICAL_PROGRESSION: NOT CHANGED

## 2018-06-22 ASSESSMENT — ACTIVITIES OF DAILY LIVING (ADL): EFFECT OF PAIN ON DAILY ACTIVITIES: LIMITS ACTIVITIES

## 2018-06-22 ASSESSMENT — PAIN DESCRIPTION - FREQUENCY: FREQUENCY: CONTINUOUS

## 2018-06-22 ASSESSMENT — PAIN DESCRIPTION - DESCRIPTORS: DESCRIPTORS: ACHING;CONSTANT

## 2018-06-22 ASSESSMENT — PAIN DESCRIPTION - ONSET: ONSET: ON-GOING

## 2018-06-22 ASSESSMENT — PAIN DESCRIPTION - LOCATION: LOCATION: BACK

## 2018-07-13 DIAGNOSIS — G89.29 CHRONIC BILATERAL LOW BACK PAIN WITH SCIATICA, SCIATICA LATERALITY UNSPECIFIED: ICD-10-CM

## 2018-07-13 DIAGNOSIS — M54.40 CHRONIC BILATERAL LOW BACK PAIN WITH SCIATICA, SCIATICA LATERALITY UNSPECIFIED: ICD-10-CM

## 2018-07-17 RX ORDER — OXYCODONE HYDROCHLORIDE 15 MG/1
15 TABLET ORAL EVERY 6 HOURS PRN
Qty: 120 TABLET | Refills: 0 | Status: SHIPPED | OUTPATIENT
Start: 2018-07-24 | End: 2018-08-23

## 2018-08-14 ENCOUNTER — HOSPITAL ENCOUNTER (OUTPATIENT)
Dept: PAIN MANAGEMENT | Age: 33
Discharge: HOME | End: 2018-08-14
Payer: MEDICARE

## 2018-08-14 VITALS
DIASTOLIC BLOOD PRESSURE: 69 MMHG | OXYGEN SATURATION: 95 % | WEIGHT: 103 LBS | BODY MASS INDEX: 16.55 KG/M2 | HEART RATE: 60 BPM | HEIGHT: 66 IN | SYSTOLIC BLOOD PRESSURE: 116 MMHG | TEMPERATURE: 96.5 F | RESPIRATION RATE: 18 BRPM

## 2018-08-14 DIAGNOSIS — M70.62 TROCHANTERIC BURSITIS OF BOTH HIPS: Chronic | ICD-10-CM

## 2018-08-14 DIAGNOSIS — M70.61 TROCHANTERIC BURSITIS OF BOTH HIPS: Chronic | ICD-10-CM

## 2018-08-14 DIAGNOSIS — M70.61 TROCHANTERIC BURSITIS OF BOTH HIPS: ICD-10-CM

## 2018-08-14 DIAGNOSIS — M70.62 TROCHANTERIC BURSITIS OF BOTH HIPS: ICD-10-CM

## 2018-08-14 PROCEDURE — 3209999900 FLUORO FOR SURGICAL PROCEDURES

## 2018-08-14 PROCEDURE — 20610 DRAIN/INJ JOINT/BURSA W/O US: CPT

## 2018-08-14 PROCEDURE — 2500000003 HC RX 250 WO HCPCS

## 2018-08-14 PROCEDURE — 20553 NJX 1/MLT TRIGGER POINTS 3/>: CPT

## 2018-08-14 PROCEDURE — 6360000002 HC RX W HCPCS

## 2018-08-14 RX ORDER — BUPIVACAINE HYDROCHLORIDE 5 MG/ML
INJECTION, SOLUTION EPIDURAL; INTRACAUDAL
Status: COMPLETED | OUTPATIENT
Start: 2018-08-14 | End: 2018-08-14

## 2018-08-14 RX ORDER — TRIAMCINOLONE ACETONIDE 40 MG/ML
INJECTION, SUSPENSION INTRA-ARTICULAR; INTRAMUSCULAR
Status: COMPLETED | OUTPATIENT
Start: 2018-08-14 | End: 2018-08-14

## 2018-08-14 RX ADMIN — BUPIVACAINE HYDROCHLORIDE 18 ML: 5 INJECTION, SOLUTION EPIDURAL; INTRACAUDAL at 14:09

## 2018-08-14 RX ADMIN — BUPIVACAINE HYDROCHLORIDE 9.5 ML: 5 INJECTION, SOLUTION EPIDURAL; INTRACAUDAL at 14:11

## 2018-08-14 RX ADMIN — TRIAMCINOLONE ACETONIDE 80 MG: 40 INJECTION, SUSPENSION INTRA-ARTICULAR; INTRAMUSCULAR at 14:10

## 2018-08-14 RX ADMIN — TRIAMCINOLONE ACETONIDE 20 MG: 40 INJECTION, SUSPENSION INTRA-ARTICULAR; INTRAMUSCULAR at 14:11

## 2018-08-14 ASSESSMENT — PAIN DESCRIPTION - LOCATION: LOCATION: BACK;HIP

## 2018-08-14 ASSESSMENT — PAIN SCALES - GENERAL: PAINLEVEL_OUTOF10: 6

## 2018-08-14 ASSESSMENT — PAIN - FUNCTIONAL ASSESSMENT: PAIN_FUNCTIONAL_ASSESSMENT: 0-10

## 2018-08-14 ASSESSMENT — PAIN DESCRIPTION - ORIENTATION: ORIENTATION: RIGHT;LEFT;LOWER;UPPER

## 2018-08-14 ASSESSMENT — PAIN DESCRIPTION - ONSET: ONSET: ON-GOING

## 2018-08-14 ASSESSMENT — PAIN DESCRIPTION - PAIN TYPE: TYPE: CHRONIC PAIN

## 2018-08-14 ASSESSMENT — PAIN DESCRIPTION - DESCRIPTORS: DESCRIPTORS: ACHING;CONSTANT

## 2018-08-14 ASSESSMENT — PAIN DESCRIPTION - PROGRESSION: CLINICAL_PROGRESSION: NOT CHANGED

## 2018-08-14 ASSESSMENT — PAIN DESCRIPTION - FREQUENCY: FREQUENCY: CONTINUOUS

## 2018-08-14 NOTE — PROCEDURES
Trapezius   [x] Latissimus   [] Erector Spinae                 [] Supraspinatus   [] Infraspinatus   [] Paraspinous   [] Other     There were no complications. DIAGNOSES:    [] Muscle tension and spasms   [x] *Myofascial Pain Syndrome   [] Cervicalgia  [] Cephalgia   [] Other     PLAN:  [x] Will return to office in 3 month(s) for   [x] Planned Procedure  [] Office Visit. [] Prescriptions were given today   [] No prescriptions needed today  [] Patient is to call with any questions or concerns which may arise prior to the next office visit. COMMENTS:    Discussed smoking cessation. Francisco Hall MD          [] Over 50%of today's appointment was given to discussion, evaluation and counseling. Francisco Hall MD                [] Over 50% of today's appointment was given to discussion, evaluation and counseling.   Francisco Hall MD  8/14/2018

## 2018-08-14 NOTE — PROGRESS NOTES
Procedure:  Level of Consciousness: [x]Alert [x]Oriented []Disoriented []Lethargic  Anxiety Level: [x]Calm []Anxious []Depressed []Other  Skin: [x]Warm [x]Dry []Cool []Moist []Intact []Other  Cardiovascular: [x]Palpitations: [x]Never []Occasionally []Frequently  Chest Pain: [x]No []Yes  Respiratory:  [x]Unlabored []Labored []Cough ([] Productive []Unproductive)  HCG Required: [x]No []Yes   Results: []Negative []Positive  Knowledge Level:        [x]Patient/Other verbalized understanding of pre-procedure instructions. [x]Assessment of post-op care needs (transportation, responsible caregiver)        [x]Able to discuss health care problems and how to deal with it.   Factors that Affect Teaching:        Language Barrier: [x]No []Yes - why:        Hearing Loss:        [x]No []Yes            Corrective Device:  []Yes [x]No        Vision Loss:           []No [x]Yes            Corrective Device:  [x]Yes []No        Memory Loss:       [x]No []Yes            []Short Term []Long Term  Motivational Level:  [x]Asks Questions                  []Extremely Anxious       [x]Seems Interested               []Seems Uninterested                  []Denies need for Education  Risk for Injury:  [x]Patient oriented to person, place and time  []History of frequent falls/loss of balance  Nutritional:  []Change in appetite   []Weight Gain   []Weight Loss  Functional:  []Requires assistance with ADL's

## 2018-12-12 ENCOUNTER — APPOINTMENT (OUTPATIENT)
Dept: MRI IMAGING | Facility: HOSPITAL | Age: 33
End: 2018-12-12

## 2020-03-25 ENCOUNTER — HOSPITAL ENCOUNTER (EMERGENCY)
Facility: HOSPITAL | Age: 35
Discharge: HOME | End: 2020-03-25
Attending: EMERGENCY MEDICINE | Admitting: EMERGENCY MEDICINE

## 2020-03-25 VITALS
DIASTOLIC BLOOD PRESSURE: 92 MMHG | RESPIRATION RATE: 18 BRPM | HEIGHT: 66 IN | WEIGHT: 120 LBS | TEMPERATURE: 98.8 F | HEART RATE: 108 BPM | SYSTOLIC BLOOD PRESSURE: 137 MMHG | BODY MASS INDEX: 19.29 KG/M2 | OXYGEN SATURATION: 98 %

## 2020-03-25 DIAGNOSIS — J06.9 VIRAL URI WITH COUGH: Primary | ICD-10-CM

## 2020-03-25 PROCEDURE — 99282 EMERGENCY DEPT VISIT SF MDM: CPT

## 2020-03-25 RX ORDER — QUETIAPINE FUMARATE 50 MG/1
50 TABLET, FILM COATED ORAL NIGHTLY
COMMUNITY
End: 2021-01-11

## 2020-06-23 ENCOUNTER — TRANSCRIBE ORDERS (OUTPATIENT)
Dept: ADMINISTRATIVE | Facility: HOSPITAL | Age: 35
End: 2020-06-23

## 2020-06-23 DIAGNOSIS — Z01.818 PREOP TESTING: Primary | ICD-10-CM

## 2021-01-11 PROBLEM — F32.A DEPRESSION: Status: ACTIVE | Noted: 2018-04-30

## 2021-01-11 PROBLEM — G35 MULTIPLE SCLEROSIS (HCC): Status: ACTIVE | Noted: 2020-09-10

## 2021-01-11 PROBLEM — M13.851: Status: ACTIVE | Noted: 2018-04-24

## 2021-01-11 PROBLEM — M54.2 NECK PAIN: Status: ACTIVE | Noted: 2018-06-22

## 2021-01-11 PROBLEM — G43.009 MIGRAINE WITHOUT AURA AND RESPONSIVE TO TREATMENT: Status: ACTIVE | Noted: 2019-01-29

## 2021-01-11 PROBLEM — G47.00 INSOMNIA: Status: ACTIVE | Noted: 2019-07-01

## 2021-01-11 PROBLEM — Z71.6 TOBACCO ABUSE COUNSELING: Status: ACTIVE | Noted: 2019-10-09

## 2021-01-11 PROBLEM — Z72.0 TOBACCO ABUSE: Status: ACTIVE | Noted: 2019-10-09

## 2021-01-11 PROBLEM — G60.9 IDIOPATHIC PERIPHERAL NEUROPATHY: Status: ACTIVE | Noted: 2019-07-01

## 2021-01-11 PROBLEM — R51.9 CHRONIC DAILY HEADACHE: Status: ACTIVE | Noted: 2018-04-30

## 2021-01-11 PROBLEM — M13.852: Status: ACTIVE | Noted: 2018-04-24

## 2021-01-11 PROBLEM — M79.18 MYOFASCIAL PAIN: Status: ACTIVE | Noted: 2021-01-11

## 2021-01-11 PROBLEM — F41.9 ANXIETY: Status: ACTIVE | Noted: 2018-04-30

## 2021-01-11 PROBLEM — C56.9 PRIMARY MALIGNANT NEOPLASM OF OVARY (HCC): Status: ACTIVE | Noted: 2020-09-10

## 2021-01-11 PROBLEM — Z79.890 HORMONE REPLACEMENT THERAPY: Status: ACTIVE | Noted: 2020-09-10

## 2021-01-11 PROCEDURE — U0003 INFECTIOUS AGENT DETECTION BY NUCLEIC ACID (DNA OR RNA); SEVERE ACUTE RESPIRATORY SYNDROME CORONAVIRUS 2 (SARS-COV-2) (CORONAVIRUS DISEASE [COVID-19]), AMPLIFIED PROBE TECHNIQUE, MAKING USE OF HIGH THROUGHPUT TECHNOLOGIES AS DESCRIBED BY CMS-2020-01-R: HCPCS | Performed by: NURSE PRACTITIONER

## 2021-02-18 ENCOUNTER — TRANSCRIBE ORDERS (OUTPATIENT)
Dept: ADMINISTRATIVE | Facility: HOSPITAL | Age: 36
End: 2021-02-18

## 2021-02-20 PROCEDURE — 87635 SARS-COV-2 COVID-19 AMP PRB: CPT | Performed by: NURSE PRACTITIONER

## 2021-08-23 ENCOUNTER — TRANSCRIBE ORDERS (OUTPATIENT)
Dept: LAB | Facility: HOSPITAL | Age: 36
End: 2021-08-23

## 2022-02-16 ENCOUNTER — HOSPITAL ENCOUNTER (OUTPATIENT)
Dept: MRI IMAGING | Facility: HOSPITAL | Age: 37
Discharge: HOME | End: 2022-02-16

## 2022-02-16 DIAGNOSIS — M50.321 OTHER CERVICAL DISC DEGENERATION AT C4-C5 LEVEL: ICD-10-CM

## 2022-02-16 DIAGNOSIS — M51.37 OTHER INTERVERTEBRAL DISC DEGENERATION, LUMBOSACRAL REGION: ICD-10-CM

## 2022-02-16 DIAGNOSIS — M50.323 OTHER CERVICAL DISC DEGENERATION AT C6-C7 LEVEL: ICD-10-CM

## 2022-02-16 DIAGNOSIS — M54.50 LOW BACK PAIN, UNSPECIFIED BACK PAIN LATERALITY, UNSPECIFIED CHRONICITY, UNSPECIFIED WHETHER SCIATICA PRESENT: ICD-10-CM

## 2022-02-16 DIAGNOSIS — M50.322 OTHER CERVICAL DISC DEGENERATION AT C5-C6 LEVEL: ICD-10-CM

## 2022-02-16 PROCEDURE — 72141 MRI NECK SPINE W/O DYE: CPT

## 2022-02-16 PROCEDURE — 72148 MRI LUMBAR SPINE W/O DYE: CPT

## 2022-05-16 ENCOUNTER — TRANSCRIBE ORDERS (OUTPATIENT)
Dept: SPEECH THERAPY | Facility: HOSPITAL | Age: 37
End: 2022-05-16

## 2022-05-16 DIAGNOSIS — R13.10 DYSPHAGIA, UNSPECIFIED TYPE: Primary | ICD-10-CM

## 2022-05-18 ENCOUNTER — HOSPITAL ENCOUNTER (OUTPATIENT)
Dept: SPEECH THERAPY | Facility: HOSPITAL | Age: 37
Setting detail: THERAPIES SERIES
Discharge: HOME | End: 2022-05-18

## 2022-05-18 DIAGNOSIS — R13.13 PHARYNGEAL DYSPHAGIA: Primary | ICD-10-CM

## 2022-05-18 PROCEDURE — 92610 EVALUATE SWALLOWING FUNCTION: CPT | Performed by: SPEECH-LANGUAGE PATHOLOGIST

## 2022-05-18 NOTE — THERAPY EVALUATION
Outpatient Speech Language Pathology   Adult Swallow Initial Evaluation  [facility]     Patient Name: Citlali Soler  : 1985  MRN: [id number]  Today's Date: 2022         Visit Date: 2022   Patient Active Problem List   Diagnosis   • Trochanteric bursitis of both hips   • Tobacco abuse counseling   • Hormone replacement therapy   • Tobacco abuse   • Rotator cuff injury   • Primary malignant neoplasm of ovary (HCC)   • Pain of both hip joints   • Pain in joint, shoulder region   • Other malaise and fatigue   • Neuromyelitis optica (HCC)   • Neck pain   • Myofascial pain   • Multiple sclerosis (HCC)   • Migraine without aura and responsive to treatment   • Insomnia   • Idiopathic peripheral neuropathy   • Allergic arthritis of both hips   • Anxiety   • Bilateral low back pain without sciatica   • Chronic daily headache   • Depression   • Hesitancy of micturition   • Hypoglycemia, unspecified        Past Medical History:   Diagnosis Date   • Neuromyelitis optica (HCC)         Past Surgical History:   Procedure Laterality Date   •  SECTION     • COLON SURGERY     • GASTRIC BYPASS     • HYSTERECTOMY           Visit Dx:     ICD-10-CM ICD-9-CM   1. Pharyngeal dysphagia  R13.13 787.23            OP SLP Assessment/Plan - 22 1020        SLP Assessment    Functional Problems Swallowing  -EC    Impact on Function: Swallowing Risk of malnourishment;Risk of dehydration;Risk of aspiration  -EC    Clinical Impression: Swallowing Mild-Moderate:;pharyngeal phase dysphagia  -EC    Functional Problems Comment Citlali has had a recent flare up of Neuromyelitis affecting the safety of her swallow.  she has increased instances of choking with solids and liquids, risk of wt loss, and reports dysarthria initially.  -EC    Clinical Impression Comments Citlali will benefit from skilled ST to address diet consistancy recommendations to maintain safety of the swallow and effective nutrition/hydration.   Citlali is a risk for wt loss as her hx with this disease and medications and will benefit from education to maintain wt/caloric intake.  Citlali will benefit from pharyngeal exercises to increase timeliness and effectiveness of the swallow.  -EC    SLP Diagnosis pharyngeal dysphagia  -EC    Prognosis Excellent (comment)  -EC    Patient/caregiver participated in establishment of treatment plan and goals Yes  -EC    Patient would benefit from skilled therapy intervention Yes  -EC       SLP Plan    Frequency 1x every other week  -EC    Duration 4 weeks  -EC    Planned CPT's? SLP SWALLOW THERAPY: 19922  -EC    Expected Duration of Therapy Session (SLP Eval) 45  -EC          User Key  (r) = Recorded By, (t) = Taken By, (c) = Cosigned By    Initials Name Provider Type    EC Zuri Espinoza CCC-SLP Speech and Language Pathologist                 SLP Adult Swallow Evaluation     Row Name 05/18/22 1020       Rehab Evaluation    Document Type evaluation  -EC    Subjective Information no complaints  -EC    Patient Observations alert;cooperative;agree to therapy  -EC    Patient/Family/Caregiver Comments/Observations none  -EC    Patient Effort excellent  -EC    Symptoms Noted During/After Treatment none  -EC       General Information    Patient Profile Reviewed yes  -EC    Pertinent History Of Current Problem pt with Neuromyelitis flare up on 5/13.  seen in ED [place] and started on solumedrol infusions and steroid tx. she is currently tapered from 60 to 30 for the next 3 days before dropping to 15.  she states numbness and tingling has resolved but mild dysphagia with solids and liquids remains.  -EC    Current Method of Nutrition soft textures;thin liquids  -EC    Precautions/Limitations, Vision WFL  -EC    Precautions/Limitations, Hearing WFL  -EC    Prior Level of Function-Communication WFL  -EC    Prior Level of Function-Swallowing no diet consistency restrictions  -EC    Plans/Goals Discussed with patient  -EC     Barriers to Rehab none identified  -EC    Patient's Goals for Discharge return to all previous roles/activities;eat/drink without coughing/choking  -EC       Oral Motor Structure and Function    Dentition Assessment natural, present and adequate  -EC    Secretion Management WNL/WFL  -EC    Mucosal Quality moist, healthy  -EC    Gag Response WFL  -EC    Volitional Swallow delayed  -EC    Volitional Cough WFL  -EC       General Eating/Swallowing Observations    Respiratory Support Currently in Use room air  -EC    Eating/Swallowing Skills self-fed  -EC    Positioning During Eating upright 90 degree;upright in chair  -EC    Utensils Used cup  -EC    Consistencies Trialed thin liquids  -EC       Clinical Swallow Eval    Pharyngeal Phase suspected pharyngeal impairment  -EC    Esophageal Phase unremarkable  -EC    Clinical Swallow Evaluation Summary --  Pt demonstrates mild delay in swallow initiation.  cough x1 with water in single sips.  she describes a feeling of muscle spasms that is relieved with swallowing.  -EC       Pharyngeal Phase Concerns    Pharyngeal Phase Concerns cough;other (see comments)  globus sensation  -EC    Cough thin  -EC       SLP Evaluation Clinical Impression    SLP Swallowing Diagnosis mild;suspected pharyngeal dysphagia  -EC    Functional Impact risk of dehydration;risk of malnutrition;risk of aspiration/pneumonia  -EC    Rehab Potential/Prognosis, Swallowing good, to achieve stated therapy goals  -EC    Swallow Criteria for Skilled Therapeutic Interventions Met demonstrates skilled criteria  -EC       Recommendations    Therapy Frequency (Swallow) 1 day every other week  -EC    Predicted Duration Therapy Intervention (Days) 4 weeks  -EC    SLP Diet Recommendation soft textures;thin liquids;other (see comments)  cohesive bolus with no dry crumbly consistancies  -EC    Recommended Diagnostics reassess via VFSS (MBS);other (see comments)  if c/o choking or globus sensation persists  -EC     Recommended Precautions and Strategies upright posture during/after eating;small bites of food and sips of liquid;multiple swallows per bite of food;multiple swallows per sip of liquid;alternate between small bites of food and sips of liquid;hard swallow with each bite or sip;turn head to right;3 second prep;fatigue precautions  -EC    SLP Rec. for Method of Medication Administration as tolerated  -EC    Anticipated Discharge Disposition (SLP) home  -EC          User Key  (r) = Recorded By, (t) = Taken By, (c) = Cosigned By    Initials Name Provider Type    Zuri Stover CCC-SLP Speech and Language Pathologist                               OP SLP Education     Row Name 05/18/22 1020       Education    Barriers to Learning No barriers identified  -EC    Education Provided Described results of evaluation;Patient expressed understanding of evaluation;Patient participated in establishing goals and treatment plan;Patient demonstrated recommended strategies;Patient requires further education on strategies, risks  -EC    Assessed Learning needs;Learning motivation;Learning preferences;Learning readiness  -EC    Learning Motivation Strong  -EC    Learning Method Explanation;Demonstration  -EC    Teaching Response Verbalized understanding;Demonstrated understanding  -EC    Education Comments Citlali was educated for HEP 3x a day to include hard swallow x30 and tongue hold swallows x20.  [facility] including moist foods that form a cohesive bolus and to avoid dry foods.  she has been instructed to take small sips and eat in cyclic fashion.  she may use naturally thicker liquids to decrease instances of cough if needed.  -EC          User Key  (r) = Recorded By, (t) = Taken By, (c) = Cosigned By    Initials Name Effective Dates    Zuri Stover CCC-SLP 06/16/21 -                SLP OP Goals     Row Name 05/18/22 1228 05/18/22 1020       Goal Type Needed    Goal Type Needed  -- Other Adult Goals  -EC       Subjective Comments    Subjective Comments -- pt seen independently for clinical bedside swallow.  pt is a good historian.  she reports no pain  -EC       Subjective Pain    Able to rate subjective pain? -- yes  -EC    Pre-Treatment Pain Level -- 0  -EC    Post-Treatment Pain Level -- 0  -EC       Other Goals    Other Adult Goal- 1 -- Long term goal: pt to return to regular diet consistancy with no s/s of aspiration and will obtain adequate nutrition/hydration  -EC    Status: Other Adult Goal- 1 -- New  -EC    Comments: Other Adult Goal- 1 -- pt had a significant flare up of neuromyelitis in 2014 with paralysis.  swallow function took about 6 months to return to normal at that time.  -EC    Other Adult Goal- 2 -- Pt will tolerate recommended diet for liquids with no s/s of aspiration for adequate hydration.  -EC    Status: Other Adult Goal- 2 -- New  -EC    Comments: Other Adult Goal- 2 -- pt is currently on thin liquids in single sips with no straw.  she is advised to use naturally thicker occuring liquids as she does not want to  use thickener when/if choking occurs. she had one instance of cough with thin liquids during eval today noted to occur with delayed swallow initiation.  -EC    Other Adult Goal- 3 -- pt will tolerate recommended diet for solids to provide adequate nutrition with no s/s of aspiration or choking.  -EC    Status: Other Adult Goal- 3 -- New  -EC    Comments: Other Adult Goal- 3 -- pt is advised to maintain a mech soft diet with cohesive/moist foods that do not dry or crumble.  pt to stay away from bread, raw veggies.  -EC    Other Adult Goal- 4 -- pt will perform pharyngeal exercises 3x a day in order to improve timelyness and effectiveness of the swallow during PO  -EC    Status: Other Adult Goal- 4 -- New  -EC    Comments: Other Adult Goal- 4 -- pt will perform swallow 30x 3x a day.  She will perform tongue hold/swallow against resistance 20x 3x a day.  -EC        SLP Time Calculation    SLP Goal Re-Cert Due Date 06/17/22  -EC 06/17/22  -EC          User Key  (r) = Recorded By, (t) = Taken By, (c) = Cosigned By    Initials Name Provider Type    Zuri Stover CCC-SLP Speech and Language Pathologist                       Time Calculation:   SLP Start Time: 1020  SLP Stop Time: 1100  SLP Time Calculation (min): 40 min  Total Timed Code Minutes- SLP: 40 minute(s)  Untimed Charges  SLP Eval/Re-eval : ST Eval Oral Pharyng Swallow - 66268  [id number]-[id number] Eval Oral Pharyng Swallow Minutes: 40  Total Minutes  Untimed Charges Total Minutes: 40   Total Minutes: 40    Therapy Charges for Today     Code Description Service Date Service Provider Modifiers Qty    [id number]  ST EVAL ORAL PHARYNG SWALLOW 3 5/18/2022 Zuri Espinoza CCC-SLP GN 1                   GERMAIN Sullivan  5/18/2022

## 2022-05-23 ENCOUNTER — APPOINTMENT (OUTPATIENT)
Dept: SPEECH THERAPY | Facility: HOSPITAL | Age: 37
End: 2022-05-23